# Patient Record
Sex: FEMALE | Race: WHITE
[De-identification: names, ages, dates, MRNs, and addresses within clinical notes are randomized per-mention and may not be internally consistent; named-entity substitution may affect disease eponyms.]

---

## 2019-11-15 ENCOUNTER — HOSPITAL ENCOUNTER (OUTPATIENT)
Dept: HOSPITAL 19 - SDCO | Age: 44
Discharge: HOME | End: 2019-11-15
Attending: SPECIALIST
Payer: COMMERCIAL

## 2019-11-15 VITALS — DIASTOLIC BLOOD PRESSURE: 67 MMHG | TEMPERATURE: 97.4 F | HEART RATE: 57 BPM | SYSTOLIC BLOOD PRESSURE: 113 MMHG

## 2019-11-15 VITALS — SYSTOLIC BLOOD PRESSURE: 101 MMHG | HEART RATE: 62 BPM | TEMPERATURE: 97.1 F | DIASTOLIC BLOOD PRESSURE: 66 MMHG

## 2019-11-15 VITALS — SYSTOLIC BLOOD PRESSURE: 92 MMHG | HEART RATE: 53 BPM | DIASTOLIC BLOOD PRESSURE: 59 MMHG

## 2019-11-15 VITALS — HEIGHT: 69.02 IN | WEIGHT: 196.65 LBS | BODY MASS INDEX: 29.13 KG/M2

## 2019-11-15 VITALS — SYSTOLIC BLOOD PRESSURE: 108 MMHG | HEART RATE: 58 BPM | DIASTOLIC BLOOD PRESSURE: 68 MMHG

## 2019-11-15 DIAGNOSIS — K64.0: ICD-10-CM

## 2019-11-15 DIAGNOSIS — K92.1: Primary | ICD-10-CM

## 2019-11-15 DIAGNOSIS — E78.00: ICD-10-CM

## 2019-11-15 DIAGNOSIS — E66.9: ICD-10-CM

## 2019-11-15 NOTE — NUR
Pt to [facility] via cart from [facility]. Pt drowsy, but awake. Denies pain or
nausea. Pt ambulates to recliner with stand by assistance x2. VSS.  in
room. Muffin and soda given per pt request. Will continue to monitor. Call
light within reach.

## 2019-11-15 NOTE — NUR
Pt continues to rest. Denies needs. Tolerting food and fluids without
diffiuclties. Call light within reach.

## 2019-11-15 NOTE — NUR
Discharge instructions reviewed. Pt voices understanding. IV site discontinued
with all parts intact. Pt up to dress.

## 2021-09-15 ENCOUNTER — HOSPITAL ENCOUNTER (OUTPATIENT)
Dept: HOSPITAL 19 - MC.RAD | Age: 46
End: 2021-09-15
Attending: FAMILY MEDICINE
Payer: COMMERCIAL

## 2021-09-15 DIAGNOSIS — Z12.31: Primary | ICD-10-CM
